# Patient Record
(demographics unavailable — no encounter records)

---

## 2025-05-27 NOTE — HISTORY OF PRESENT ILLNESS
[FreeTextEntry1] : Since his last visit with me, he has been having increased personal stress related to family issues. He has not been as active and has not been exercising as much as he was a few months back.  He has gained about 8 pounds. He has been experiencing discomfort in his mid sternum, occurring at rest, exacerbated when he leans forward.  Symptoms are relieved when he leans back. Chest pain not occurring with effort or exertion.  There is no associated shortness of breath or diaphoresis. Denies palpitations.  No dizziness, lightheadedness, syncope or near syncope.  No edema.  No orthopnea.  No PND.

## 2025-05-27 NOTE — PHYSICAL EXAM
[Normal S1, S2] : normal S1, S2 [No Rub] : no rub [No Gallop] : no gallop [Murmur] : murmur [Normal] : alert and oriented, normal memory [de-identified] : l/Vl systolic

## 2025-05-27 NOTE — DISCUSSION/SUMMARY
[FreeTextEntry1] : 71-year-old man with history of paroxysmal atrial tachycardia, hypertension, hyperlipidemia, APCs. New complaints of chest pain that appear to have mostly atypical features, as noted above. On physical examination, blood pressure is stable.  Appears euvolemic.  No new cardiac murmurs rubs or gallops are noted. EKG is sinus rhythm, first-degree AV block, no significant ST changes.  Largely unchanged from prior. Further evaluation regarding atypical chest pain is indicated.  Plan 1.  Exercise stress test to evaluate functional capacity and evaluate for stress-induced coronary insufficiency. 2.  Echocardiogram to exclude structural heart disease. 3.  Current medication list is reviewed, no changes. 4.  Review blood work recently done through primary care provider's office. 5.  Further recommendations pending above workup. 6.  The above was reviewed with the patient and all of his questions have been answered to his satisfaction.   [EKG obtained to assist in diagnosis and management of assessed problem(s)] : EKG obtained to assist in diagnosis and management of assessed problem(s)

## 2025-05-27 NOTE — CARDIOLOGY SUMMARY
[___] : [unfilled] [de-identified] : May 29 2024.   Sinus Rhythm -First degree A-V block  Darvin = 228 BORDERLINE RHYTHM

## 2025-05-27 NOTE — REASON FOR VISIT
[FreeTextEntry3] : Dr. Nelson Hamilton  [FreeTextEntry1] :  Cardiology follow-up visit for evaluation management of history of paroxysmal atrial tachycardia, hypertension, hyperlipidemia, APCs.  Patient also now having atypical chest pain.